# Patient Record
Sex: MALE | Race: BLACK OR AFRICAN AMERICAN | Employment: FULL TIME | URBAN - METROPOLITAN AREA
[De-identification: names, ages, dates, MRNs, and addresses within clinical notes are randomized per-mention and may not be internally consistent; named-entity substitution may affect disease eponyms.]

---

## 2019-08-07 ENCOUNTER — OFFICE VISIT (OUTPATIENT)
Dept: FAMILY MEDICINE | Facility: CLINIC | Age: 34
End: 2019-08-07
Payer: COMMERCIAL

## 2019-08-07 VITALS
HEIGHT: 71 IN | TEMPERATURE: 98.3 F | RESPIRATION RATE: 16 BRPM | DIASTOLIC BLOOD PRESSURE: 73 MMHG | SYSTOLIC BLOOD PRESSURE: 116 MMHG | WEIGHT: 214.6 LBS | OXYGEN SATURATION: 98 % | HEART RATE: 88 BPM | BODY MASS INDEX: 30.04 KG/M2

## 2019-08-07 DIAGNOSIS — Z78.9 HISTORY OF INCARCERATION: ICD-10-CM

## 2019-08-07 DIAGNOSIS — Z76.89 ENCOUNTER TO ESTABLISH CARE: Primary | ICD-10-CM

## 2019-08-07 DIAGNOSIS — Z11.3 ROUTINE SCREENING FOR STI (SEXUALLY TRANSMITTED INFECTION): ICD-10-CM

## 2019-08-07 DIAGNOSIS — J33.9 NASAL POLYP: ICD-10-CM

## 2019-08-07 LAB — HIV 1+2 AB+HIV1 P24 AG SERPL QL IA: NEGATIVE

## 2019-08-07 ASSESSMENT — MIFFLIN-ST. JEOR: SCORE: 1935.55

## 2019-08-07 NOTE — PROGRESS NOTES
Assessment and Plan       Kulwant Mueller is a 34 year old male without significant past medical hx who presented to clinic today for establishment of care, STI screen, and nasal polyp concerns    Encounter to establish care  Medical, surgical, social history reviewed and updated    Routine screening for STI (sexually transmitted infection)  Possible exposure with intermittent condom use. Adamantly denies IVDU. Unknown vaccine hx, also check Hep B.   - Chlamydia/Gono Amplified ([facility])  - Syphilis Screen [place] ([facility])  - HIV Ag/Ab Screen [place] ([facility])  - Hepatitis B Surface Ag ([facility])  - Hepatitis B Surface Ab ([facility])    Nasal polyp  Likely benign polyp. No history or symptoms to suggest cystic fibrosis. Reports prior work-up in [place], will attempt to retrieve records, referral to ENT for evaluation and indications for removal.   - OTOLARYNGOLOGY REFERRAL    History of incarceration  Denies sexual assault. No signs or symptoms of TB. Denies any mood or trauma concerns.       Options for treatment and follow-up care were reviewed with the patient. Kulwant Mueller engaged in the decision making process and verbalized understanding of the options discussed and agreed with the final plan.    Benjamin Rosenstein, MD, MA  [facility]  P: [phone]      Precepted today with: Lai Smith MD         HPI:       Chief Complaint   Patient presents with     Physical     Concerns about polyp in nose and also get an STI screen.      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 [place] for years, see below  -No cough, hemoptysis, no SOB  -No fevers, no night sweats    Nasal Polyp  -Has been  "present at least 1 year, states that's when he noticed it  -Also notes it was worked up while in penitentiary -- released in 2016  -Says saw ENT in [place] for polyp - was told it was not cancerous but would remove it due to size  -Is obstructing breathing currently, difficulty with strenuous activities  -Was released from penitentiary prior to it being taken out, did not have coverage after that  -Does not seem to have gotten worse since being released, just continues to be obstructive  -Bleeds and is painful at times as well  -States had asthma when younger but no severe breathing issues. No recurrent lung infections  -Denies smoking now (cannabis), quit before going to penitentiary. Denies every cigarette smoking  -Denies use of NSAIDs or ASA in past  -Had used nasal spray in past, was not helpful  -No known family hx of polyps    STI Screening  -No specific concerns  -Denies any male-male relationships or assault while in penitentiary  -Only has intercourse or sexual activity with women  -Most recently had sex last night  -Has multiple partners currently - \"a few\", denied to specify  -Has had STDs in past, states had gonorrhea before about 1 month ago and was treated. Denies knowledge of prior or current partners with this.  -No dysuria, no hematuria  -Denies any open sores or wounds, no painful wounds  -Denies any rashes  -No myalgias, no arthralgias  -Denies IVDU ever  -Uses condoms about 1/2 time    PMHX  -Negative  -Admitted 2017 due AMS thought d/t K2 exposure (not use per chart)    SurgHx  -Denies surgeries except dental work    FamHx  -Blood pressure    Social Hx  -Denies ever smoking cigarettes, smoked cannabis prior to penitentiary  -Denies any other substance use  -Only uses alcohol when goes out, but goes out rarely  -Federal long-term in [place] for drug use and selling - Cannabis  -Was in penitentiary for 8 years, was released May 2016  -Not currently working - actively looking for a job  -Denies any income currently. Using food stamps and relying " "on others for support  -Currently living with girlfriend, denies having place of his own. Feels safe  -Has 2 kids, 13 yo boy, 17 yo girl         Review of Systems:      ROS: 10 point ROS neg other than the symptoms noted above in the HPI.               PFSH:   Problem List   There is no problem list on file for this patient.       Medications   No current outpatient medications on file.        Social History      See HPI    History   Smoking Status     Never Smoker   Smokeless Tobacco     Never Used           No Known Allergies    Physical Exam        Physical Exam:     Vitals:    08/07/19 1333   BP: 116/73   Pulse: 88   Resp: 16   Temp: 98.3  F (36.8  C)   TempSrc: Oral   SpO2: 98%   Weight: 97.3 kg (214 lb 9.6 oz)   Height: 1.803 m (5' 11\")     Body mass index is 29.93 kg/m .    General: Awake, seated comfortably, appears well and NAD   HEENT:  - Head: Atraumatic, normocephalic  - Eyes: Corneas clear, sclera without injection, no discharge, EOMI, PERRLA  - Ears: Canals patent, minimal cerumen, TMs normal appearance without fluid or bulging  - Nose: Nares patent, poly of anterior inner right nare  - Throat: Oropharynx clear without lesions, tonsils normal, posterior pharynx without erythema, swelling, or exudate   Lymph: No anterior/posterior cervical or occipital lymphadenopathy   CV: RRR, normal S1/S2, no murmurs/rubs/gallops, Radial and DP pulses 2/4   Pulm: Normal WOB, lungs CTAB, no wheezes or crackles, good air movement   GI: Abdomen soft, not tender, not distended, no organomegaly, BS normal and active throughout   : Normal circumcised external male genitalia, no ulcers or lesions, no discharge  MSK: No gross deformities, full AROM throughout   Skin: Clear complexion, no rashes, lesions, or bruising   Neuro: Alert, answering questions appropriately, normal thought processes;   -CN II-XII grossly intact  -Normal 5/5 strength throughout, DTRs 2/4  -Normal Gait     Pending  Orders Placed This Encounter "   Procedures     Chlamydia/Gono Amplified ([facility])     Syphilis Screen [place] ([facility])     HIV Ag/Ab Screen [place] ([facility])     Hepatitis B Surface Ag ([facility])     Hepatitis B Surface Ab ([facility])       There are no discontinued medications.    Patient Instructions   Patient Instructions   Thank you for seeing us at [facility]    I have sent a referral the nose doctors to get the polyp removed    We will do testing today and contact you with the results    See us as other issues arise and at least yearly          This note was completed with the assistance of dictation software. Typos and word substitution-errors are expected and unintended.

## 2019-08-07 NOTE — PATIENT INSTRUCTIONS
Thank you for seeing us at [facility]    I have sent a referral the nose doctors to get the polyp removed    We will do testing today and contact you with the results    See us as other issues arise and at least yearly    Referral for :     Otolaryngology   LOCATION/PLACE/Provider :    [facility] [address]   DATE & TIME :    Aug. 15th at 3:30 pm  PHONE :     [phone]  FAX :    [phone]  Appointment made by clinic staff/:    Ondina

## 2019-08-08 PROBLEM — Z78.9 HISTORY OF INCARCERATION: Status: ACTIVE | Noted: 2019-08-08

## 2019-08-08 PROBLEM — J33.9 NASAL POLYP: Status: ACTIVE | Noted: 2019-08-08

## 2019-08-08 LAB
C TRACH RRNA SPEC QL NAA+PROBE: NEGATIVE
HBV SURFACE AB SER-ACNC: NEGATIVE M[IU]/ML
HBV SURFACE AG SERPL QL IA: NEGATIVE
N GONORRHOEA RRNA SPEC QL NAA+PROBE: NEGATIVE
TREPONEMA ANTIBODY (SYPHILIS): NEGATIVE

## 2019-08-09 NOTE — RESULT ENCOUNTER NOTE
I called the patient and discussed his results. All testing negative. Hep B shows non-immunity, recommended vaccination.    Team, please call and schedule patient for nurse visit for hep b vaccination.      Benjamin Rosenstein, MD, MA

## 2019-08-09 NOTE — PROGRESS NOTES
I have personally reviewed the history and examination as documented by Dr. Rosenstein.  I was present during key portions of the visit and agree with the assessment and plan as documented for 34 yr old male with nasal polyps here to establish care. Will refer to ENT. STI testing done. Precautions given. Anticipatory guidance given.     Lai Smith MD  August 8, 2019  9:36 PM

## 2019-08-09 NOTE — RESULT ENCOUNTER NOTE
Pt received results from provider. I tried to reach pt to schedule a nurse visit but no answer and could not leave a voice message due to voicemail being full.

## 2019-10-15 ENCOUNTER — OFFICE VISIT (OUTPATIENT)
Dept: FAMILY MEDICINE | Facility: CLINIC | Age: 34
End: 2019-10-15
Payer: COMMERCIAL

## 2019-10-15 ENCOUNTER — RESULTS ONLY (OUTPATIENT)
Dept: FAMILY MEDICINE | Facility: CLINIC | Age: 34
End: 2019-10-15

## 2019-10-15 DIAGNOSIS — Z11.3 ROUTINE SCREENING FOR STI (SEXUALLY TRANSMITTED INFECTION): ICD-10-CM

## 2019-10-15 DIAGNOSIS — Z01.818 PREOP GENERAL PHYSICAL EXAM: ICD-10-CM

## 2019-10-15 DIAGNOSIS — J33.9 NASAL POLYP: Primary | ICD-10-CM

## 2019-10-16 VITALS
WEIGHT: 227 LBS | HEIGHT: 73 IN | BODY MASS INDEX: 30.09 KG/M2 | DIASTOLIC BLOOD PRESSURE: 92 MMHG | HEART RATE: 79 BPM | SYSTOLIC BLOOD PRESSURE: 143 MMHG | RESPIRATION RATE: 16 BRPM | TEMPERATURE: 97.3 F | OXYGEN SATURATION: 95 %

## 2019-10-16 LAB
C TRACH RRNA SPEC QL NAA+PROBE: NEGATIVE
N GONORRHOEA RRNA SPEC QL NAA+PROBE: NEGATIVE

## 2019-10-16 ASSESSMENT — MIFFLIN-ST. JEOR: SCORE: 2015.61

## 2019-10-16 NOTE — PROGRESS NOTES
"  Assessment and Plan   (Z11.3) Routine screening for STI (sexually transmitted infection)  Comment: He was not interested in doing a blood test to screen for other pathogens. It is certainly reasonable to screen for GC/Chlamydia despite lack of symptoms.   Plan: Chlamydia/Gono Amplified ([facility])    Follow up as needed.    Options for treatment and follow-up care were reviewed with the patient and/or guardian. Kulwant Mueller and/or guardian engaged in the decision making process and verbalized understanding of the options discussed and agreed with the final plan.    Brendan Padilla MD  [facility], PGY-1    Precepted patient with Dr. Jerson Cantrell       HPI:   Kulwant Mueller is a 34 year old male who presents to clinic today for   Chief Complaint   Patient presents with     Pre-Op Exam     buckshot removal on oct. 28 with Dr Zachary Vyas Medication     complete     He is requesting STI screening, seeing as he states he \"was cheating and the condom broke\" 3 weeks ago. He has had no unusual symptoms such as discharge or fever/chills since then. He is worried that his significant other will suspect him because he is asking to use condoms and normally they do not.    Pt states he smokes 3-4 cigarettes per day and has done so for a long time, drinks 3-4 shots of tequila per day, and smokes roughly 15 blunts of marijuana per day, pretty much all day long every day.         Review of Systems:     10 point ROS negative except as noted in HPI.         PMHX:   Active Problems List  Patient Active Problem List   Diagnosis     Nasal polyp     History of incarceration     Active problem list reviewed and updated.    Current Medications  No current outpatient medications on file.     Medication list reviewed and updated.    Social History  Social History     Tobacco Use     Smoking status: Never Smoker     Smokeless " "tobacco: Never Used   Substance Use Topics     Alcohol use: Yes     Comment: ocassionally     Drug use: Yes     Types: Marijuana     History   Drug Use     Types: Marijuana       Family History  Family History   Problem Relation Age of Onset     Hypertension Mother      Hypertension Father        Allergies  No Known Allergies         Physical Exam:     Vitals:    10/16/19 0901   BP: (!) 143/92   Pulse: 79   Resp: 16   Temp: 97.3  F (36.3  C)   TempSrc: Oral   SpO2: 95%   Weight: 103 kg (227 lb)   Height: 1.842 m (6' 0.5\")     Body mass index is 30.36 kg/m .    GENERAL APPEARANCE: alert, appears stated age, no acute distress  HEENT: Eyes grossly normal to inspection, nares normal, and mouth and throat without erythema, ulcers, or lesions  RESP: lungs clear to auscultation - no rales, rhonchi, or wheezes  CV: regular rate and rhythm, no murmurs  ABDOMEN: soft, nontender   MSK: extremities normal, no gross deformities noted, no lower extremity edema  SKIN: no suspicious lesions or rashes   NEURO: Normal strength and tone, sensory exam grossly normal, mentation appears intact and speech normal  PSYCH: mood and affect appropriate      "

## 2019-10-18 NOTE — PATIENT INSTRUCTIONS
Before Your Surgery      Call your surgeon if there is any change in your health. This includes signs of a cold or flu (such as a sore throat, runny nose, cough, rash or fever).    Do not smoke, drink alcohol or take over the counter medicine (unless your surgeon or primary care doctor tells you to) for the 24 hours before and after surgery.    If you take prescribed drugs: Follow your doctor s orders about which medicines to take and which to stop until after surgery.    Eating and drinking prior to surgery: follow the instructions from your surgeon    Take a shower or bath the night before surgery. Use the soap your surgeon gave you to gently clean your skin. If you do not have soap from your surgeon, use your regular soap. Do not shave or scrub the surgery site.  Wear clean pajamas and have clean sheets on your bed.     Pre-op faxed to fax number :  [phone]  [facility]  Date of Surgery :  10/28/19  By/Date :  10/22/19

## 2019-10-22 NOTE — PROGRESS NOTES
Preceptor Attestation:   Patient seen, evaluated and discussed with the resident. I have verified the content of the note, which accurately reflects my assessment of the patient and the plan of care.  I reviewed the EKG and agree with the interpretation.  Supervising Physician:Jerson Cantrell MD  [facility]

## 2022-11-17 NOTE — PROGRESS NOTES
[facility] [address] [place] [address]  [phone]  Dept: [phone]    PRE-OP EVALUATION:  Today's date: 10/15/2019    Kulwantjennyfer Mueller (: 1985) presents for pre-operative evaluation assessment as requested by Dr. David Zavala.  He requires evaluation and anesthesia risk assessment prior to undergoing surgery/procedure for treatment of Nasal polypectomy .    Proposed Surgery/ Procedure: Nasal polypectomy   Date of Surgery/ Procedure: 10/28/2019  Time of Surgery/ Procedure: AM  Hospital/Surgical Facility: [facility]  Fax number for surgical facility: [phone]  Primary Physician: Rosenstein, Benjamin  Type of Anesthesia Anticipated: General    Patient has a Health Care Directive or Living Will:  NO    1. NO - Do you have a history of heart attack, stroke, stent, bypass or surgery on an artery in the head, neck, heart or legs?  2. NO - Do you ever have any pain or discomfort in your chest?  3. NO - Do you have a history of  Heart Failure?  4. NO - Are you troubled by shortness of breath when: walking on the level, up a slight hill or at night?  5. NO - Do you currently have a cold, bronchitis or other respiratory infection?  6. NO - Do you have a cough, shortness of breath or wheezing?  7. NO - Do you sometimes get pains in the calves of your legs when you walk?  8. NO - Do you or anyone in your family have previous history of blood clots?  9. NO - Do you or does anyone in your family have a serious bleeding problem such as prolonged bleeding following surgeries or cuts?  10. NO - Have you ever had problems with anemia or been told to take iron pills?  11. NO - Have you had any abnormal blood loss such as black, tarry or bloody stools?  12. NO - Have you ever had a blood transfusion?  13. NO - Have you or any of your relatives ever had problems with anesthesia?  14. NO - Do you have sleep apnea, excessive snoring or daytime  Pt cb lm no longer comes to endo Dr Brett Noguera is managing his diabetes "drowsiness?  15. NO - Do you have any prosthetic heart valves?  16. NO - Do you have prosthetic joints?      HPI:     HPI related to upcoming procedure: His nasal polyp causes him difficulty breathing through his nose and is the only thing that causes him trouble this way. He also had a gum tumor surgically removed, but does not remember any other details about it.      No chronic health problems.    MEDICAL HISTORY:     Patient Active Problem List    Diagnosis Date Noted     Nasal polyp 08/08/2019     Priority: Medium     History of incarceration 08/08/2019     Priority: Medium      No past medical history on file.  Past Surgical History:   Procedure Laterality Date     DENTAL SURGERY       No current outpatient medications on file.     OTC products: None, except as noted above    No Known Allergies   Latex Allergy: NO    Social History     Tobacco Use     Smoking status: Current Every Day Smoker     Packs/day: 0.00     Smokeless tobacco: Never Used   Substance Use Topics     Alcohol use: Yes     Comment: ocassionally     History   Drug Use     Types: Marijuana       REVIEW OF SYSTEMS:   Constitutional, neuro, ENT, endocrine, pulmonary, cardiac, gastrointestinal, genitourinary, musculoskeletal, integument and psychiatric systems are negative, except as otherwise noted.    EXAM:   BP (!) 143/92   Pulse 79   Temp 97.3  F (36.3  C) (Oral)   Resp 16   Ht 1.842 m (6' 0.5\")   Wt 103 kg (227 lb)   SpO2 95%   BMI 30.36 kg/m    Body mass index is 30.36 kg/m .    GENERAL APPEARANCE: alert, appears stated age, no acute distress  HEENT: Eyes grossly normal to inspection, nares normal, and mouth and throat without erythema, ulcers, or lesions  RESP: lungs clear to auscultation - no rales, rhonchi, or wheezes  CV: regular rate and rhythm, no murmurs  ABDOMEN: soft, nontender   MSK: extremities normal, no gross deformities noted, no lower extremity edema  SKIN: no suspicious lesions or rashes   NEURO: Normal strength and " "tone, sensory exam grossly normal, mentation appears intact and speech normal  PSYCH: mood and affect appropriate      DIAGNOSTICS:   EKG: appears normal, NSR, normal axis, normal intervals, no acute ST/T changes c/w ischemia, no LVH by voltage criteria, there are no prior tracings available    No results for input(s): HGB, PLT, INR, NA, POTASSIUM, CR, A1C in the last 83716 hours.     IMPRESSION:   Reason for surgery/procedure: Nasal polyps  Diagnosis/reason for consult: clearance for anesthesia    The proposed surgical procedure is considered LOW risk.    REVISED CARDIAC RISK INDEX  The patient has the following serious cardiovascular risks for perioperative complications such as (MI, PE, VFib and 3  AV Block):  No serious cardiac risks  INTERPRETATION: 0 risks: Class I (very low risk - 0.4% complication rate)    The patient has the following additional risks for perioperative complications:  No identified additional risks  Obesity.  Family hx of Sudden cardiac death.    (J33.9) Nasal polyp  (primary encounter diagnosis)  Comment: cleared for surgery  Plan: proceed with preop instructions    (Z01.818) Preop general physical exam  Comment:   He denies personal history of cardiac conditions, although states his father \" in his sleep\" in his 30s and his brother suffered the same fate at age 15-16 and he thinks it was something related to their hearts.   Plan: EKG 12-lead complete w/read - Clinics            RECOMMENDATIONS:       APPROVAL GIVEN to proceed with proposed procedure, without further diagnostic evaluation         Signed Electronically by: Aroldo Padilla MD  Addended by Veronica Lowry MD      Copy of this evaluation report is provided to requesting physician.    Rob Preop Guidelines    Revised Cardiac Risk Index  "